# Patient Record
Sex: MALE | Race: WHITE | NOT HISPANIC OR LATINO | URBAN - METROPOLITAN AREA
[De-identification: names, ages, dates, MRNs, and addresses within clinical notes are randomized per-mention and may not be internally consistent; named-entity substitution may affect disease eponyms.]

---

## 2017-01-11 ENCOUNTER — EMERGENCY (EMERGENCY)
Facility: HOSPITAL | Age: 27
LOS: 1 days | Discharge: HOME | End: 2017-01-11
Attending: EMERGENCY MEDICINE | Admitting: EMERGENCY MEDICINE
Payer: COMMERCIAL

## 2017-01-11 VITALS
HEART RATE: 74 BPM | DIASTOLIC BLOOD PRESSURE: 75 MMHG | OXYGEN SATURATION: 97 % | TEMPERATURE: 99 F | SYSTOLIC BLOOD PRESSURE: 129 MMHG | RESPIRATION RATE: 18 BRPM

## 2017-01-11 DIAGNOSIS — J02.9 ACUTE PHARYNGITIS, UNSPECIFIED: ICD-10-CM

## 2017-01-11 LAB — S PYO AG SPEC QL IA: POSITIVE

## 2017-01-11 PROCEDURE — 99283 EMERGENCY DEPT VISIT LOW MDM: CPT

## 2017-01-11 RX ORDER — IBUPROFEN 200 MG
600 TABLET ORAL ONCE
Qty: 0 | Refills: 0 | Status: DISCONTINUED | OUTPATIENT
Start: 2017-01-11 | End: 2017-01-11

## 2017-01-11 RX ORDER — PENICILLIN V POTASSIUM 250 MG
500 TABLET ORAL ONCE
Qty: 0 | Refills: 0 | Status: COMPLETED | OUTPATIENT
Start: 2017-01-11 | End: 2017-01-11

## 2017-01-11 NOTE — ED PROVIDER NOTE - NS ED ATTENDING STATEMENT MOD
I have reviewed and agree with all pertinent clinical information, including history and physical exam and agree with treatment plan of the PA. I have reviewed and agree with all pertinent clinical information, including history and physical exam and agree with treatment plan of the NP.

## 2017-01-11 NOTE — ED PROVIDER NOTE - PROGRESS NOTE DETAILS
The scribe's documentation has been prepared under my direction and personally reviewed by me in its entirety. I confirm that the note above accurately reflects all work, treatment, procedures, and medical decision making performed by me.  JAKUB Ochoa

## 2017-01-11 NOTE — ED PROVIDER NOTE - NS ED MD SCRIBE ATTENDING SCRIBE SECTIONS
HISTORY OF PRESENT ILLNESS/REVIEW OF SYSTEMS/INTAKE ASSESSMENT/SCREENINGS/PAST MEDICAL/SURGICAL/SOCIAL HISTORY/PHYSICAL EXAM/VITAL SIGNS( Pullset)/HIV

## 2017-01-11 NOTE — ED PROVIDER NOTE - MEDICAL DECISION MAKING DETAILS
27 y/o M presents to ED c/o persistent sore throat x 1 week s/p recent URI.  + cervical adenopathy.  No cough.  Pt refused pain medication during ED visit. 27 y/o M presents to ED c/o persistent sore throat x 1 week s/p recent URI.  + cervical adenopathy.  No cough.  Pt refused pain medication during ED visit.  + rapid strep.  Will treat with PCN.  Salt water gargles, ibuprofen for pain and return precautions.

## 2017-01-11 NOTE — ED PROVIDER NOTE - ENMT, MLM
Airway patent, Nasal mucosa clear. Uvula slightly edematous and erythematous. No tonsillar exudates. Tonsils 2+.

## 2017-01-12 RX ORDER — PENICILLIN V POTASSIUM 250 MG
1 TABLET ORAL
Qty: 30 | Refills: 0 | OUTPATIENT
Start: 2017-01-12 | End: 2017-01-22

## 2019-10-03 NOTE — ED PROVIDER NOTE - OBJECTIVE STATEMENT
26y M Pt presents to ED with sore throat x1 week. Pain does not radiate. Pt states he believes he has strep throat and requests flu swab. Symptoms constant, not worsening. Associated cold over the past few days that has resolved. Denies cough, fever, nausea vomiting, and CP. Possible sick contacts. Complex Repair And V-Y Plasty Text: The defect edges were debeveled with a #15 scalpel blade.  The primary defect was closed partially with a complex linear closure.  Given the location of the remaining defect, shape of the defect and the proximity to free margins a V-Y plasty was deemed most appropriate for complete closure of the defect.  Using a sterile surgical marker, an appropriate advancement flap was drawn incorporating the defect and placing the expected incisions within the relaxed skin tension lines where possible.    The area thus outlined was incised deep to adipose tissue with a #15 scalpel blade.  The skin margins were undermined to an appropriate distance in all directions utilizing iris scissors.

## 2020-01-27 ENCOUNTER — EMERGENCY (EMERGENCY)
Facility: HOSPITAL | Age: 30
LOS: 1 days | Discharge: HOME | End: 2020-01-27
Attending: EMERGENCY MEDICINE | Admitting: EMERGENCY MEDICINE
Payer: MEDICAID

## 2020-01-27 VITALS
RESPIRATION RATE: 18 BRPM | WEIGHT: 145.06 LBS | HEIGHT: 72 IN | SYSTOLIC BLOOD PRESSURE: 138 MMHG | TEMPERATURE: 98 F | DIASTOLIC BLOOD PRESSURE: 79 MMHG | OXYGEN SATURATION: 98 % | HEART RATE: 80 BPM

## 2020-01-27 PROCEDURE — 73130 X-RAY EXAM OF HAND: CPT | Mod: 26,RT

## 2020-01-27 PROCEDURE — 99283 EMERGENCY DEPT VISIT LOW MDM: CPT | Mod: 25

## 2020-01-27 NOTE — ED PROVIDER NOTE - SKIN, MLM
Radial aspect of mid R finger consistent with small pyoderma gangrenosum. No swelling throughout. No overlying erythema. No streaking. No expressible purulence. No active bleeding.

## 2020-01-27 NOTE — ED PROVIDER NOTE - CARE PROVIDER_API CALL
Norman Garcia)  Plastic Surgery  [address] [place] [address]  Phone: [phone]  Fax: [phone]  Follow Up Time:

## 2020-01-27 NOTE — ED PROVIDER NOTE - CLINICAL SUMMARY MEDICAL DECISION MAKING FREE TEXT BOX
30 y/o male with lesion to R 3rd digit. Suspicion for pyoderma gangrenosum.   Will contact Dr Garcia, on call plastic surgeon. 30 y/o male with lesion to R 3rd digit. Suspicion for pyoderma gangrenosum.   Will contact Dr Garcia, on call plastic surgeon.    Update @ 8255 - Dr. Garcia in ED to [facility] and has reviewed x rays as he requested.  X ray without evidence of acute process.  Patient is agreeable to recommended plan of prompt follow up with Dr. Garcia in office in 1-2 days for procedure for likely removal, etc.  Patient is instructed to keep area clean and dry and aware he can apply OTC abx ointment to area until follow up.  He is instructed to return to ED immediately should his symptoms worsen or if he has any concern prior to this recommended follow up.  Will discharge home at this time.

## 2020-01-27 NOTE — ED PROVIDER NOTE - PATIENT PORTAL LINK FT
You can access the FollowMyHealth Patient Portal offered by [facility] by registering at the following website: [email or web address]. By joining [employer]’s FollowMyHealth portal, you will also be able to view your health information using other applications (apps) compatible with our system.

## 2020-01-27 NOTE — ED PROVIDER NOTE - NSFOLLOWUPINSTRUCTIONS_ED_ALL_ED_FT
Please follow up with your primary physician in 1-2 days for re evaluation.  Please return to ER immediately should your symptoms worsen or if you have any concern prior to this recommended follow up.    Dermatitis    WHAT YOU NEED TO KNOW:    Dermatitis is skin inflammation. You may have an itchy rash, redness, or swelling. You may also have bumps or blisters that crust over or ooze clear fluid. Dermatitis can be caused by allergens such as dust mites, pet dander, pollen, and certain foods. It can also develop when something touches your skin and irritates it or causes an allergic reaction. Examples include soaps, chemicals, latex, and poison ivy.    DISCHARGE INSTRUCTIONS:    Call 911 if you have any of the following symptoms of anaphylaxis:     Sudden trouble breathing      Throat swelling and tightness      Dizziness, lightheadedness, fainting, or confusion     Return to the emergency department if:     You develop a fever or have red streaks going up your arm or leg.      Your rash gets more swollen, red, or hot.    Contact your healthcare provider if:     Your skin blisters, oozes white or yellow pus, or has a foul-smelling discharge.      Your rash spreads or does not get better, even after treatment.      You have questions or concerns about your condition or care.    Medicines:     Medicines help decrease itching and inflammation, or treat a bacterial infection. They may be given as a topical cream, shot, or a pill.       Take your medicine as directed. Contact your healthcare provider if you think your medicine is not helping or if you have side effects. Tell him of her if you are allergic to any medicine. Keep a list of the medicines, vitamins, and herbs you take. Include the amounts, and when and why you take them. Bring the list or the pill bottles to follow-up visits. Carry your medicine list with you in case of an emergency.    Apply a cool compress to your rash: This will help soothe your skin.     Keep your skin moist: Rub unscented cream or lotion on your skin to prevent dryness and itching. Do this right after a lukewarm bath or shower when your skin is still damp.    Avoid skin irritants: Do not use skin irritants, such as makeup, hair products, soaps, and cleansers. Use products that do not contain fragrances or dye.    Follow up with your healthcare provider as directed: Write down your questions so you remember to ask them during your visits.        © Copyright [employer] 2020       back to top                      © Copyright [employer] 2020

## 2020-01-27 NOTE — ED PROVIDER NOTE - OBJECTIVE STATEMENT
28 y/o male with no pertinent PMHx presents to ED c/o right 3rd finger "growth" x 1 month. Pt reports finger was caught under a weight at the gym 1 month ago and sustained a blood blister, possibly broke skin. States it was healing but wound re-opened, bled and growing since. Denies any pain to finger. 30 y/o male with no pertinent PMHx presents to ED c/o right 3rd finger "growth" x 1 month. Pt reports finger was caught under a weight at the gym 1 month ago and sustained a blood blister, possibly broke skin. States it was healing but wound re-opened, bled and growth has developed to area since. Denies any pain to finger, fever, chills, streaking into finger/hand, pain with passive finger extension, swelling to finger or any additional acute complaints or concerns at this time.

## 2020-02-02 DIAGNOSIS — L88 PYODERMA GANGRENOSUM: ICD-10-CM

## 2020-02-02 DIAGNOSIS — L98.9 DISORDER OF THE SKIN AND SUBCUTANEOUS TISSUE, UNSPECIFIED: ICD-10-CM

## 2022-01-09 ENCOUNTER — EMERGENCY (EMERGENCY)
Facility: HOSPITAL | Age: 32
LOS: 1 days | Discharge: HOME | End: 2022-01-09
Admitting: EMERGENCY MEDICINE
Payer: COMMERCIAL

## 2022-01-09 VITALS
SYSTOLIC BLOOD PRESSURE: 138 MMHG | TEMPERATURE: 98 F | RESPIRATION RATE: 18 BRPM | OXYGEN SATURATION: 98 % | WEIGHT: 149.91 LBS | HEIGHT: 72 IN | DIASTOLIC BLOOD PRESSURE: 79 MMHG | HEART RATE: 74 BPM

## 2022-01-09 DIAGNOSIS — M25.561 PAIN IN RIGHT KNEE: ICD-10-CM

## 2022-01-09 PROCEDURE — 99283 EMERGENCY DEPT VISIT LOW MDM: CPT

## 2022-01-09 PROCEDURE — 73564 X-RAY EXAM KNEE 4 OR MORE: CPT | Mod: 26,RT,59

## 2022-01-09 NOTE — ED PROVIDER NOTE - CARE PROVIDER_API CALL
René Atkins)  [facility]  [address] [place] [address], [address] [place] [address]  Phone: [phone]  Fax: [phone]  Follow Up Time:

## 2022-01-09 NOTE — ED PROVIDER NOTE - CLINICAL SUMMARY MEDICAL DECISION MAKING FREE TEXT BOX
Patient here for persistent right knee pain. X-ray is unremarkable. Will advise ortho f/u. Stable for discharge.

## 2022-01-09 NOTE — ED PROVIDER NOTE - OBJECTIVE STATEMENT
32 y/o male who states he was playing football 1 week ago when he developed right knee pain. Here for evaluation of persistent knee pain. No other associated injuries or medical complaints at this time. No aggravating or alleviating factors noted. No numbness, no tingling, no weakness.

## 2022-01-09 NOTE — ED PROVIDER NOTE - NSFOLLOWUPINSTRUCTIONS_ED_ALL_ED_FT
Contusion    A contusion is a deep bruise. Contusions are the result of a blunt injury to tissues and muscle fibers under the skin. The skin overlying the contusion may turn blue, purple, or yellow. Symptoms also include pain and swelling in the injured area.    SEEK IMMEDIATE MEDICAL CARE IF YOU HAVE ANY OF THE FOLLOWING SYMPTOMS: severe pain, numbness, tingling, pain, weakness, or skin color/temperature change in any part of your body distal to the injury. Follow up with your primary care doctor or clinics listed below if you do not have a doctor  Return immediately for any new or worsening symptoms or any new concerns

## 2022-01-09 NOTE — ED PROVIDER NOTE - PATIENT PORTAL LINK FT
You can access the FollowMyHealth Patient Portal offered by [facility] by registering at the following website: [email or web address]. By joining [employer]’s FollowMyHealth portal, you will also be able to view your health information using other applications (apps) compatible with our system.

## 2022-01-10 PROBLEM — Z00.00 ENCOUNTER FOR PREVENTIVE HEALTH EXAMINATION: Status: ACTIVE | Noted: 2022-01-10

## 2022-01-11 ENCOUNTER — APPOINTMENT (OUTPATIENT)
Dept: ORTHOPEDIC SURGERY | Facility: CLINIC | Age: 32
End: 2022-01-11
Payer: COMMERCIAL

## 2022-01-11 VITALS — HEIGHT: 72 IN | WEIGHT: 150 LBS | BODY MASS INDEX: 20.32 KG/M2

## 2022-01-11 DIAGNOSIS — M23.90 UNSPECIFIED INTERNAL DERANGEMENT OF UNSPECIFIED KNEE: ICD-10-CM

## 2022-01-11 PROCEDURE — 99203 OFFICE O/P NEW LOW 30 MIN: CPT

## 2022-01-11 NOTE — HISTORY OF PRESENT ILLNESS
[de-identified] : Location: Right knee\par Duration: 1/2/22\par Context: felt a tweak in his knee while playing football\par Quality: sharp\par Aggravating factors: bending, walking, pressure\par Associated symptoms: swelling\par Conservative treatment: rest, brace, RICE\par Prior studies: X-rays [facility] 1/9/22

## 2022-01-11 NOTE — ASSESSMENT
[FreeTextEntry1] : Discussed at length with patient exam history and imaging and concern given the severity of the swelling in the knee and tenderness for possible meniscus pathology versus possible ACL tear.  Discussed with patient exam somewhat limited secondary to the swelling and guarding and he agrees with MRI evaluation\par \par

## 2022-01-11 NOTE — PHYSICAL EXAM
[de-identified] : Right knee\par \par Constitutional: \par The patient is healthy-appearing and in no apparent distress. \par \par Gait:\par The patient ambulates with a slight limp.\par \par Cardiovascular System: \par The capillary refill is less than 2 seconds. \par \par Skin: \par There are no skin abnormalities except a moderate effusion.\par \par Right Knee:\par  \par Bony Palpation: \par There is no tenderness of the medial joint line. \par There is tenderness of the lateral joint line.\par There is no tenderness of the medial femoral chondyle.\par There is no tenderness of the lateral femoral chondyle.\par There is no tenderness of the tibial tubercle.\par There is no tenderness of the superior patella.\par There is no tenderness of the inferior patella.\par There is no tenderness of the medial patellar facet.\par There is no tenderness of the lateral patellar facet.\par \par Soft Tissue Palpation: \par There is no tenderness of the medial retinaculum.\par There is no tenderness of the lateral retinaculum.\par There is no tenderness of the quadriceps tendon.\par There is no tenderness of the patella tendon.\par There is no tenderness of the ITB.\par There is no tenderness of the pes anserine.\par \par Active Range of Motion: \par The range of motion at the knee actively and passively is 0 - 125 with pain at end flexion. \par \par Special Tests: \par There is a negative Apley.\par There is a negative Steinmanns. \par There is a negative Lachman and Anterior Drawer ( but limited secondary to guarding and swellling ).\par There is a negative Posterior Drawer.  \par There is no varus or valgus laxity.\par \par Strength: \par There is 5/5 hip flexion and 5/5 knee flexion and extension.  \par \par Psychiatric: \par The patient demonstrates a normal mood and affect and is active and alert [de-identified] : X-ray right knee ( [facility] ): There is no significant bony / soft tissue abnormality, arthritis, or fracture.

## 2022-01-19 ENCOUNTER — LABORATORY RESULT (OUTPATIENT)
Age: 32
End: 2022-01-19

## 2022-01-21 ENCOUNTER — APPOINTMENT (OUTPATIENT)
Dept: ORTHOPEDIC SURGERY | Facility: AMBULATORY SURGERY CENTER | Age: 32
End: 2022-01-21

## 2022-01-25 ENCOUNTER — APPOINTMENT (OUTPATIENT)
Dept: ORTHOPEDIC SURGERY | Facility: CLINIC | Age: 32
End: 2022-01-25

## 2022-01-28 ENCOUNTER — APPOINTMENT (OUTPATIENT)
Age: 32
End: 2022-01-28
Payer: COMMERCIAL

## 2022-01-28 PROCEDURE — 29881 ARTHRS KNE SRG MNISECTMY M/L: CPT | Mod: RT

## 2022-01-28 PROCEDURE — 29875 ARTHRS KNEE SURG SYNVCT LMTD: CPT | Mod: RT,59

## 2022-01-28 RX ORDER — OXYCODONE AND ACETAMINOPHEN 5; 325 MG/1; MG/1
5-325 TABLET ORAL
Qty: 30 | Refills: 0 | Status: ACTIVE | COMMUNITY
Start: 2022-01-28 | End: 1900-01-01

## 2022-02-01 ENCOUNTER — APPOINTMENT (OUTPATIENT)
Dept: ORTHOPEDIC SURGERY | Facility: CLINIC | Age: 32
End: 2022-02-01
Payer: COMMERCIAL

## 2022-02-01 DIAGNOSIS — S83.289A OTHER TEAR OF LATERAL MENISCUS, CURRENT INJURY, UNSPECIFIED KNEE, INITIAL ENCOUNTER: ICD-10-CM

## 2022-02-01 PROCEDURE — 99024 POSTOP FOLLOW-UP VISIT: CPT

## 2022-02-01 NOTE — HISTORY OF PRESENT ILLNESS
[de-identified] : s/p RIGHT knee arthroscopy with partial lateral menisectomy, llim syn [de-identified] : He is 4 days postop states overall he is feeling markedly better and not requiring any pain medication.  He is able to ambulate without need of assistance. [de-identified] : On exam of the right knee, there is a mild effusion consistent with expectation.  Wounds are clean dry and intact with sutures which are removed and Steri-Stripped.  Range of motion need for full extension to 130° with mild pain at end flexion.  No tenderness to the medial or lateral joint line [de-identified] : s/p RIGHT knee arthroscopy with partial lateral menisectomy, llim syn [de-identified] : Discussed at length with patient the surgery as well as postop protocols.  Patient elects home exercises only and declines formal physical therapy at this time.  He is to follow up in 3 weeks

## 2022-02-22 ENCOUNTER — APPOINTMENT (OUTPATIENT)
Dept: ORTHOPEDIC SURGERY | Facility: CLINIC | Age: 32
End: 2022-02-22

## 2023-03-13 NOTE — ED PROVIDER NOTE - X-RAY INTERPRETATION
03/13/23                            Lilian Tariq  [id number] [facility] [address]    To Whom It May Concern:    This is to certify Lilian Tariq was evaluated with ALINA Ibanez on 03/13/23 and can return to regular work on 3/15/2023.     RESTRICTIONS: none            Electronically signed by:  ALINA Ibanez  [facility] at [address] [address]  Dept Phone: [phone]     
ER physician